# Patient Record
Sex: MALE | Race: WHITE
[De-identification: names, ages, dates, MRNs, and addresses within clinical notes are randomized per-mention and may not be internally consistent; named-entity substitution may affect disease eponyms.]

---

## 2018-04-04 ENCOUNTER — HOSPITAL ENCOUNTER (EMERGENCY)
Dept: HOSPITAL 62 - ER | Age: 63
Discharge: HOME | End: 2018-04-04
Payer: SELF-PAY

## 2018-04-04 VITALS — DIASTOLIC BLOOD PRESSURE: 90 MMHG | SYSTOLIC BLOOD PRESSURE: 149 MMHG

## 2018-04-04 DIAGNOSIS — Z88.0: ICD-10-CM

## 2018-04-04 DIAGNOSIS — I10: ICD-10-CM

## 2018-04-04 DIAGNOSIS — R31.9: Primary | ICD-10-CM

## 2018-04-04 DIAGNOSIS — I48.91: ICD-10-CM

## 2018-04-04 DIAGNOSIS — R30.0: ICD-10-CM

## 2018-04-04 LAB
ADD MANUAL DIFF: NO
ANION GAP SERPL CALC-SCNC: 13 MMOL/L (ref 5–19)
APPEARANCE UR: (no result)
APTT PPP: YELLOW S
BASOPHILS # BLD AUTO: 0.1 10^3/UL (ref 0–0.2)
BASOPHILS NFR BLD AUTO: 0.7 % (ref 0–2)
BILIRUB UR QL STRIP: NEGATIVE
BUN SERPL-MCNC: 19 MG/DL (ref 7–20)
CALCIUM: 9.7 MG/DL (ref 8.4–10.2)
CHLORIDE SERPL-SCNC: 104 MMOL/L (ref 98–107)
CO2 SERPL-SCNC: 27 MMOL/L (ref 22–30)
EOSINOPHIL # BLD AUTO: 0.2 10^3/UL (ref 0–0.6)
EOSINOPHIL NFR BLD AUTO: 1.9 % (ref 0–6)
ERYTHROCYTE [DISTWIDTH] IN BLOOD BY AUTOMATED COUNT: 13.7 % (ref 11.5–14)
GLUCOSE SERPL-MCNC: 92 MG/DL (ref 75–110)
GLUCOSE UR STRIP-MCNC: NEGATIVE MG/DL
HCT VFR BLD CALC: 41.8 % (ref 37.9–51)
HGB BLD-MCNC: 14.1 G/DL (ref 13.5–17)
KETONES UR STRIP-MCNC: NEGATIVE MG/DL
LYMPHOCYTES # BLD AUTO: 2.6 10^3/UL (ref 0.5–4.7)
LYMPHOCYTES NFR BLD AUTO: 20.2 % (ref 13–45)
MCH RBC QN AUTO: 30 PG (ref 27–33.4)
MCHC RBC AUTO-ENTMCNC: 33.7 G/DL (ref 32–36)
MCV RBC AUTO: 89 FL (ref 80–97)
MONOCYTES # BLD AUTO: 1.3 10^3/UL (ref 0.1–1.4)
MONOCYTES NFR BLD AUTO: 10.2 % (ref 3–13)
NEUTROPHILS # BLD AUTO: 8.7 10^3/UL (ref 1.7–8.2)
NEUTS SEG NFR BLD AUTO: 67 % (ref 42–78)
NITRITE UR QL STRIP: NEGATIVE
PH UR STRIP: 5 [PH] (ref 5–9)
PLATELET # BLD: 235 10^3/UL (ref 150–450)
POTASSIUM SERPL-SCNC: 4.2 MMOL/L (ref 3.6–5)
PROT UR STRIP-MCNC: 30 MG/DL
RBC # BLD AUTO: 4.69 10^6/UL (ref 4.35–5.55)
SODIUM SERPL-SCNC: 143.9 MMOL/L (ref 137–145)
SP GR UR STRIP: 1.03
TOTAL CELLS COUNTED % (AUTO): 100 %
UROBILINOGEN UR-MCNC: NEGATIVE MG/DL (ref ?–2)
WBC # BLD AUTO: 13 10^3/UL (ref 4–10.5)

## 2018-04-04 PROCEDURE — 76380 CAT SCAN FOLLOW-UP STUDY: CPT

## 2018-04-04 PROCEDURE — 99284 EMERGENCY DEPT VISIT MOD MDM: CPT

## 2018-04-04 PROCEDURE — 87086 URINE CULTURE/COLONY COUNT: CPT

## 2018-04-04 PROCEDURE — 80048 BASIC METABOLIC PNL TOTAL CA: CPT

## 2018-04-04 PROCEDURE — 36415 COLL VENOUS BLD VENIPUNCTURE: CPT

## 2018-04-04 PROCEDURE — 81001 URINALYSIS AUTO W/SCOPE: CPT

## 2018-04-04 PROCEDURE — 85025 COMPLETE CBC W/AUTO DIFF WBC: CPT

## 2018-04-04 NOTE — RADIOLOGY REPORT (SQ)
EXAM DESCRIPTION:  CT LTD RENAL STONE PROTOCOL ON



COMPLETED DATE/TIME:  4/4/2018 8:23 pm



REASON FOR STUDY:  hematuria; eval stone



COMPARISON:  None.



TECHNIQUE:  CT scan of the abdomen and pelvis performed without intravenous or oral contrast. Images 
reviewed with lung, soft tissue, and bone windows. Reconstructed coronal and sagittal MPR images revi
ewed. All images stored on PACS.

All CT scanners at this facility use dose modulation, iterative reconstruction, and/or weight based d
osing when appropriate to reduce radiation dose to as low as reasonably achievable (ALARA).

[facility]: Dose Right  [facility]: CareDose    [facility]: Dose Right    [facility]: Teradose 4D    [facility]: Smart Technologies



RADIATION DOSE:  CT Rad equipment meets quality standard of care and radiation dose reduction techniq
ues were employed. CTDIvol: 9.2 mGy. DLP: 500 mGy-cm.mGy.



LIMITATIONS:  None.



FINDINGS:  LOWER CHEST: No significant findings. No nodules or infiltrates.

NON-CONTRASTED LIVER, SPLEEN, ADRENALS: Evaluation limited by lack of IV contrast. No identified sign
ificant masses.

PANCREAS: No masses. No peripancreatic inflammatory changes.

GALLBLADDER: No identified stones by CT criteria. No inflammatory changes to suggest cholecystitis.

RIGHT KIDNEY AND URETER: No suspicious masses. Assessment limited by lack of IV contrast.   No signif
icant calcifications.   No hydronephrosis or hydroureter.

LEFT KIDNEY AND URETER: No suspicious masses. Assessment limited by lack of IV contrast.   No signifi
cant calcifications.   No hydronephrosis or hydroureter.

AORTA AND RETROPERITONEUM: No aneurysm. No retroperitoneal masses or adenopathy.

BOWEL AND PERITONEAL CAVITY: No obvious masses or inflammatory changes.  Sigmoid diverticulosis.  No 
free fluid.

APPENDIX: Normal.

PELVIS, BLADDER, AND ABDOMINAL WALL:No abnormal masses. No free fluid. Bladder normal.

BONES: No significant findings.

OTHER: No other significant finding.



IMPRESSION:  NO ACUTE PROCESS IN THE ABDOMEN OR PELVIS.



COMMENT:  Quality ID # [id number]: Final reports with documentation of one or more dose reduction techniques
 (e.g., Automated exposure control, adjustment of the mA and/or kV according to patient size, use of 
iterative reconstruction technique)



TECHNICAL DOCUMENTATION:  JOB ID:  [id number]

[id number]

 2011 [employer]- All Rights Reserved



Reading location - IP/workstation name: [email or web address]

## 2018-04-04 NOTE — ER DOCUMENT REPORT
ED GI/





- General


Chief Complaint: Urinary Problem


Stated Complaint: BLOOD IN URINE


Time Seen by Provider: 04/04/18 19:51


Mode of Arrival: Ambulatory


Information source: Patient





- HPI


Patient complains to provider of: Hematuria


Onset: Other - 3 DAYS


Timing/Duration: Gradual


Quality of pain: Pressure - IN BLADDER AREA


Severity at maximum: Mild


Severity in ED: Mild


Location: Suprapubic


Associated symptoms: Dysuria, Hematuria, Urinary frequency, Urinary urgency.  

denies: Chills, Fever


Exacerbated by: Denies


Relieved by: Denies


Similar symptoms previously: No


Recently seen / treated by doctor: No





- Related Data


Allergies/Adverse Reactions: 


 





Penicillins Allergy (Verified 04/04/18 19:45)


 











Past Medical History





- General


Information source: Patient





- Social History


Smoking Status: Unknown if Ever Smoked


Frequency of alcohol use: Occasional


Drug Abuse: None


Lives with: Spouse/Significant other


Family History: None


Patient has suicidal ideation: No


Patient has homicidal ideation: No





- Past Medical History


Cardiac Medical History: Reports: Hx Atrial Fibrillation, Hx Hypertension


Pulmonary Medical History: Reports: None


EENT Medical History: Reports: None


Neurological Medical History: Reports: None


Endocrine Medical History: Reports: None


Renal/ Medical History: Reports: None.  Denies: Hx Peritoneal Dialysis


Malignancy Medical History: Reports None


GI Medical History: Reports: None


Musculoskeltal Medical History: Reports None


Psychiatric Medical History: Reports: None


Past Surgical History: Reports: Hx Herniorrhaphy





Review of Systems





- Review of Systems


Constitutional: No symptoms reported


EENT: No symptoms reported


Cardiovascular: No symptoms reported


Respiratory: No symptoms reported


Gastrointestinal: No symptoms reported


Genitourinary: See HPI


Male Genitourinary: See HPI


Musculoskeletal: No symptoms reported


Skin: No symptoms reported


Neurological/Psychological: No symptoms reported





Physical Exam





- Vital signs


Vitals: 


 











Temp Pulse Resp BP Pulse Ox


 


 98.6 F   70   18   156/77 H  96 


 


 04/04/18 17:32  04/04/18 17:32  04/04/18 17:32  04/04/18 17:32  04/04/18 17:32











Interpretation: Hypertensive.  No: Tachycardic, Tachypneic, Febrile





- General


General appearance: Appears well, Alert


In distress: None





- HEENT


Head: Normocephalic


Eyes: Normal


Conjunctiva: Normal


Ears: Normal


Nasal: Normal


Mouth/Lips: Normal


Mucous membranes: Normal





- Respiratory


Respiratory status: No respiratory distress





- Cardiovascular


Rhythm: Regular





- Abdominal


Inspection: Normal


Distension: No distension


Tenderness: Nontender





- Back


Back: Normal





- Extremities


General upper extremity: Normal inspection


General lower extremity: Normal inspection





- Neurological


Neuro grossly intact: Yes


Cognition: Normal


Orientation: AAOx4





- Psychological


Associated symptoms: Normal affect, Normal mood





- Skin


Skin Temperature: Warm


Skin Moisture: Dry


Skin Color: Normal


Skin Turgor: Elastic





Course





- Vital Signs


Vital signs: 


 











Temp Pulse Resp BP Pulse Ox


 


 98.6 F   70   18   156/77 H  96 


 


 04/04/18 17:32  04/04/18 17:32  04/04/18 17:32  04/04/18 17:32  04/04/18 17:32














- Laboratory


Result Diagrams: 


 04/04/18 20:05





 04/04/18 20:05


Laboratory results interpreted by me: 


 











  04/04/18 04/04/18





  18:55 20:05


 


WBC   13.0 H


 


Absolute Neutrophils   8.7 H


 


Urine Protein  30 H 


 


Urine Blood  MODERATE H 


 


Ur Leukocyte Esterase  LARGE H 


 


Urine Ascorbic Acid  20 H 














Discharge





- Discharge


Clinical Impression: 


 Hematuria





Condition: Stable


Disposition: HOME, SELF-CARE


Instructions:  Hematuria ([facility]), Urinary Anesthetic Agent ([facility]), Trimethoprim-

Sulfa ([facility])


Additional Instructions: 


DRINK PLENTY OF FLUIDS.


MEDS AS DIRECTED.


FOLLOW UP WITH UROLOGIST IF BLOOD DOES NOT CLEAR IN A FEW DAYS.


RETURN TO E.R. IF YOU GET WORSE IN ANY WAY.


Prescriptions: 


Phenazopyridine HCl [Pyridium 200 mg Tablet] 200 mg PO TID #15 tablet


Sulfamethoxazole/Trimethoprim [Sulfamethoxazole-Tmp Ds Tablet] 1 each PO BID #

20 tablet


Referrals: 


LEANDRO MAHONEY MD [NO LOCAL MD] - Follow up as needed

## 2018-04-04 NOTE — ER DOCUMENT REPORT
ED Medical Screen (RME)





- General


Chief Complaint: Urinary Problem


Stated Complaint: BLOOD IN URINE


Time Seen by Provider: 04/04/18 19:51


Notes: 





RME DISCLOSURE


I have seen this patient as part of a Rapid Medical Evaluation and, if 

applicable, placed any initially appropriate orders. The patient will be seen 

and fully evaluated, including a full history and physical exam, by a provider (

in Main ED or Fast Track) when a room becomes available.





------------------------------------------------------------------





62-year-old male here with complaints of "bloody discharge" in his urine has 

started this morning.  He has also had some lower abdominal pressure but no 

fevers chills nausea vomiting dysuria.  He usually has chronic back pain but 

states that he has not had any today.  He wonders if he might have a kidney 

stone.  No prior history of kidney stone.





- Related Data


Allergies/Adverse Reactions: 


 





Penicillins Allergy (Verified 04/04/18 19:45)


 











Past Medical History





- Past Medical History


Cardiac Medical History: Reports: Hx Atrial Fibrillation, Hx Hypertension


Renal/ Medical History: Denies: Hx Peritoneal Dialysis





Physical Exam





- Vital signs


Vitals: 





 











Temp Pulse Resp BP Pulse Ox


 


 98.6 F   70   18   156/77 H  96 


 


 04/04/18 17:32  04/04/18 17:32  04/04/18 17:32  04/04/18 17:32  04/04/18 17:32














Course





- Vital Signs


Vital signs: 





 











Temp Pulse Resp BP Pulse Ox


 


 98.6 F   70   18   156/77 H  96 


 


 04/04/18 17:32  04/04/18 17:32  04/04/18 17:32  04/04/18 17:32  04/04/18 17:32














- Laboratory


Laboratory results interpreted by me: 





 











  04/04/18





  18:55


 


Urine Protein  30 H


 


Urine Blood  MODERATE H


 


Ur Leukocyte Esterase  LARGE H


 


Urine Ascorbic Acid  20 H